# Patient Record
Sex: FEMALE | Race: AMERICAN INDIAN OR ALASKA NATIVE
[De-identification: names, ages, dates, MRNs, and addresses within clinical notes are randomized per-mention and may not be internally consistent; named-entity substitution may affect disease eponyms.]

---

## 2019-03-24 ENCOUNTER — HOSPITAL ENCOUNTER (EMERGENCY)
Dept: HOSPITAL 42 - ED | Age: 19
Discharge: ANOTHER HOSPITAL | End: 2019-03-24
Payer: COMMERCIAL

## 2019-03-24 VITALS — DIASTOLIC BLOOD PRESSURE: 70 MMHG | SYSTOLIC BLOOD PRESSURE: 124 MMHG | HEART RATE: 86 BPM

## 2019-03-24 VITALS — OXYGEN SATURATION: 100 % | RESPIRATION RATE: 18 BRPM | TEMPERATURE: 98.3 F

## 2019-03-24 DIAGNOSIS — S02.642A: Primary | ICD-10-CM
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
## 2019-03-24 NOTE — ED PDOC
Physical Exam


Vital Signs Reviewed: Yes





Vital Signs











  Temp Pulse Resp BP Pulse Ox


 


 03/24/19 04:56  98.3 F  108 H  18  129/93 H  100











Temperature: Afebrile


Blood Pressure: Normal


Pulse: Tachycardic


Respiratory Rate: Normal


Appearance: Positive for: Well-Appearing, Non-Toxic, Comfortable


Pain Distress: None


Mental Status: Positive for: Alert and Oriented X 3





Medical Decision Making


ED Course and Treatment: 





03/24/19 07:03


Patient signed out to me by Dr. Calvillo. Patient is pending left mandible X-

ray/reassessment/final disposition. 





03/24/19 08:36


Discussed case with [facility] transfer center. Plan to transfer patient to [facility] emergency department under service of attending Emergency department 

physican Dr. Boucher. [facility] resident accepted case.








- RAD Interpretation


Narrative RAD Interpretations (Text): 





03/24/19 08:13


Left Mandible X-Ray shows:


FINDINGS:


PA and left oblique views were obtained.


There is an acute mildly displaced fracture in the the left ramus of the 

mandible with 1 cortex with medial and inferior displacement.  Bone alignment is

normal.  The temporomandibular joints are normally located. 


There is moderate overlying soft tissue swelling. 


IMPRESSION:


Acute mildly displaced fracture in the left ramus of the mandible.  Moderate 

overlying soft tissue swelling.





Radiology Orders: 











03/24/19 05:36


MANDIBLE > 4 VIEWS [RAD] Stat 











: Radiologist





- Scribe Statement


The provider has reviewed the documentation as recorded by the Scribe


Blaze Ward 





All medical record entries made by the Scribe were at my direction and 

personally dictated by me. I have reviewed the chart and agree that the record 

accurately reflects my personal performance of the history, physical exam, 

medical decision making, and the department course for this patient. I have also

 personally directed, reviewed, and agree with the discharge instructions and 

disposition. 








Disposition/Present on Arrival





- Present on Arrival


Any Indicators Present on Arrival: No


History of DVT/PE: No


History of Uncontrolled Diabetes: No


Urinary Catheter: No


History of Decub. Ulcer: No


History Surgical Site Infection Following: None





- Disposition


Have Diagnosis and Disposition been Completed?: Yes


Diagnosis: 


 Mandible fracture





Disposition: Trans to Other Acute Care Hosp


Disposition Time: 08:18


Patient Plan: Transfer To


Condition: FAIR


Forms:  [employer] (English)
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
## 2019-03-24 NOTE — RAD
Date of service: 



03/24/2019



PROCEDURE:  Radiographs of the mandible



INDICATION:

Trauma 



COMPARISON:

 None.



FINDINGS:

PA and left oblique views were obtained.



There is an acute mildly displaced fracture in the the left ramus of 

the mandible with 1 cortex with medial and inferior displacement.  

Bone alignment is normal.  The temporomandibular joints are normally 

located. 



There is moderate overlying soft tissue swelling. 



IMPRESSION:

Acute mildly displaced fracture in the left ramus of the mandible.  

Moderate overlying soft tissue swelling.